# Patient Record
(demographics unavailable — no encounter records)

---

## 2025-04-29 NOTE — ADDENDUM
[FreeTextEntry1] : Entered by Carlos Dong, acting as scribe for Dr. Ollie Presley. The documentation recorded by the scribe accurately reflects the service I personally performed and the decisions made by me.

## 2025-04-29 NOTE — LETTER BODY
[FreeTextEntry1] : Jose Markham MD [address] [address] [phone]  Dear Dr. Markham,  Reason for Visit: Atrophic left testicle. Left renal stone. Left renal mass.    This is a 71 year-old gentleman with history of atrophic left testicle, left kidney stone, and previous right ureteral stone, presenting with small left renal mass. Her CT scan in Feb 2019 demonstrated right ureteral stone, indeterminate left renal mass and left kidney stone. Her renal ultrasound in Oct 2019 demonstrated a 1.2 cm renal mass, and a 4.4 mm nonobstructive left renal stone. His renal ultrasound from Oct 2020 demonstrated a stable 1.38 x 1.32 cm left renal mass and a 4.6 mm nonobstructive left renal stone. His renal ultrasound from Oct 2021 and 2022 demonstrated a stable 1.5 x 1.3 x 1.2 cm left renal mass and a 4 mm nonobstructive left renal stone. He obtained a prostate MRI in November 2022 demonstrating low risk for clinically significant prostate cancer, PI-RADS 2. His renal ultrasound in April 2024 demonstrated a 1.3 cm renal mass. The patient returns today for follow-up ultrasound. Ultrasound today showed renal mass unchanged in size. The size of the renal mass remains stable at 1.3 cm. He denies any interval complaints or urinary difficulties. He denies any changes in health. He currently denies any pain. Patient denies any gross hematuria or dysuria or urinary incontinence. The patient denies any interference of function. The patient is entirely asymptomatic. The past medical history, family history and social history are unchanged. Medications and allergies were reviewed.    On examination, the patient is a healthy-appearing gentleman in no acute distress. He is alert and oriented follows commands. He has normal mood and affect. He is normocephalic. Oral no thrush. Neck is supple. Respirations are unlabored. His abdomen is soft and nontender. Liver is nonpalpable. Bladder is nonpalpable. No CVA tenderness. Neurologically he is grossly intact. No peripheral edema. Skin without gross abnormality.    His BMP from April 2024 demonstrated decreased renal functions, creatinine 1.33. His PSA was 3.56, which is improved to within normal limits. His previous PSA was 6.04.    I personally reviewed ultrasound images with the patient today and images demonstrated again, there is a 1.3 cm slight hyperechoic exophytic focus noted in the left kidney upper pole, a 4 mm echogenic focus with twinkle in the left kidney mid pole and a 1.0 cm nonvascular cyst in the left kidney lower pole. right kidney appears unremarkable. Both kidneys appear normal in size and echogenicity, no hydronephrosis visualized.   Assessment: Atrophic left testicle. Left renal stone. Left renal mass. Elevated PSA.    I counseled the patient. In terms of his renal mass, his renal ultrasound today demonstrated a renal mass, unchanged in size. Renal mass remains at 1.3 cm, similar to imaging in 2024. I recommended patient follow-up in 1 year to repeat ultrasound. I reassured patient. In terms of his elevated PSA, I recommended he repeat PSA and BMP to ensure stability. Patient understands that if he develops gross hematuria or any urinary discomfort, he will contact me for further evaluation. Risks and alternatives were discussed. I answered the patient questions. The patient will follow-up as directed and will contact me with any questions or concerns. Thank you for the opportunity to participate in the care of Mr. PAEZ. I will keep you updated on his progress.    Plan: BMP. PSA. Follow-up in 1 year for repeat renal ultrasound.   I spent 30-minutes time today on all issues related to this patient on today date of service including non face to face time.

## 2025-04-29 NOTE — LETTER BODY
[FreeTextEntry1] : Jose Markham MD [address] [address] [phone]  Dear Dr. Markham,  Reason for Visit: Atrophic left testicle. Left renal stone. Left renal mass.    This is a 71 year-old gentleman with history of atrophic left testicle, left kidney stone, and previous right ureteral stone, presenting with small left renal mass. Her CT scan in Feb 2019 demonstrated right ureteral stone, indeterminate left renal mass and left kidney stone. Her renal ultrasound in Oct 2019 demonstrated a 1.2 cm renal mass, and a 4.4 mm nonobstructive left renal stone. His renal ultrasound from Oct 2020 demonstrated a stable 1.38 x 1.32 cm left renal mass and a 4.6 mm nonobstructive left renal stone. His renal ultrasound from Oct 2021 and 2022 demonstrated a stable 1.5 x 1.3 x 1.2 cm left renal mass and a 4 mm nonobstructive left renal stone. He obtained a prostate MRI in November 2022 demonstrating low risk for clinically significant prostate cancer, PI-RADS 2. His renal ultrasound in April 2024 demonstrated a 1.3 cm renal mass. The patient returns today for follow-up ultrasound. Ultrasound today showed renal mass unchanged in size. The size of the renal mass remains stable at 1.3 cm. He denies any interval complaints or urinary difficulties. He denies any changes in health. He currently denies any pain. Patient denies any gross hematuria or dysuria or urinary incontinence. The patient denies any interference of function. The patient is entirely asymptomatic. The past medical history, family history and social history are unchanged. Medications and allergies were reviewed.    On examination, the patient is a healthy-appearing gentleman in no acute distress. He is alert and oriented follows commands. He has normal mood and affect. He is normocephalic. Oral no thrush. Neck is supple. Respirations are unlabored. His abdomen is soft and nontender. Liver is nonpalpable. Bladder is nonpalpable. No CVA tenderness. Neurologically he is grossly intact. No peripheral edema. Skin without gross abnormality.    His BMP from April 2024 demonstrated decreased renal functions, creatinine 1.33. His PSA was 3.56, which is improved to within normal limits. His previous PSA was 6.04.    I personally reviewed ultrasound images with the patient today and images demonstrated again, there is a 1.3 cm slight hyperechoic exophytic focus noted in the left kidney upper pole, a 4 mm echogenic focus with twinkle in the left kidney mid pole and a 1.0 cm nonvascular cyst in the left kidney lower pole. right kidney appears unremarkable. Both kidneys appear normal in size and echogenicity, no hydronephrosis visualized.   Assessment: Atrophic left testicle. Left renal stone. Left renal mass. Elevated PSA.    I counseled the patient. In terms of his renal mass, his renal ultrasound today demonstrated a renal mass, unchanged in size. Renal mass remains at 1.3 cm, similar to imaging in 2024. I recommended patient follow-up in 1 year to repeat ultrasound. I reassured patient. In terms of his elevated PSA, I recommended he repeat PSA and BMP to ensure stability. Patient understands that if he develops gross hematuria or any urinary discomfort, he will contact me for further evaluation. Risks and alternatives were discussed. I answered the patient questions. The patient will follow-up as directed and will contact me with any questions or concerns. Thank you for the opportunity to participate in the care of Mr. PAEZ. I will keep you updated on his progress.    Plan: BMP. PSA. Follow-up in 1 year for repeat renal ultrasound.   I spent 30-minutes time today on all issues related to this patient on today date of service including non face to face time.